# Patient Record
Sex: MALE | Employment: FULL TIME | URBAN - METROPOLITAN AREA
[De-identification: names, ages, dates, MRNs, and addresses within clinical notes are randomized per-mention and may not be internally consistent; named-entity substitution may affect disease eponyms.]

---

## 2022-06-13 ENCOUNTER — OFFICE VISIT (OUTPATIENT)
Dept: URGENT CARE | Facility: CLINIC | Age: 42
End: 2022-06-13
Payer: COMMERCIAL

## 2022-06-13 VITALS
HEART RATE: 85 BPM | DIASTOLIC BLOOD PRESSURE: 84 MMHG | TEMPERATURE: 99 F | SYSTOLIC BLOOD PRESSURE: 120 MMHG | WEIGHT: 170 LBS | RESPIRATION RATE: 16 BRPM | OXYGEN SATURATION: 97 % | BODY MASS INDEX: 26.68 KG/M2 | HEIGHT: 67 IN

## 2022-06-13 DIAGNOSIS — R50.9 FEVER, UNSPECIFIED FEVER CAUSE: ICD-10-CM

## 2022-06-13 DIAGNOSIS — R05.9 COUGH: ICD-10-CM

## 2022-06-13 DIAGNOSIS — R52 BODY ACHES: ICD-10-CM

## 2022-06-13 DIAGNOSIS — U07.1 COVID-19 VIRUS INFECTION: Primary | ICD-10-CM

## 2022-06-13 DIAGNOSIS — J02.9 SORE THROAT: ICD-10-CM

## 2022-06-13 PROBLEM — R00.1 BRADYCARDIA: Status: ACTIVE | Noted: 2020-07-20

## 2022-06-13 LAB
EXTERNAL QUALITY CONTROL: ABNORMAL
FLUAV+FLUBV AG SPEC QL IA: NEGATIVE
INT CON NEG: NORMAL
INT CON NEG: NORMAL
INT CON POS: NORMAL
INT CON POS: NORMAL
S PYO AG THROAT QL: NEGATIVE
SARS-COV+SARS-COV-2 AG RESP QL IA.RAPID: POSITIVE

## 2022-06-13 PROCEDURE — 87426 SARSCOV CORONAVIRUS AG IA: CPT | Performed by: NURSE PRACTITIONER

## 2022-06-13 PROCEDURE — 99203 OFFICE O/P NEW LOW 30 MIN: CPT | Mod: CS | Performed by: NURSE PRACTITIONER

## 2022-06-13 PROCEDURE — 87804 INFLUENZA ASSAY W/OPTIC: CPT | Performed by: NURSE PRACTITIONER

## 2022-06-13 PROCEDURE — 87880 STREP A ASSAY W/OPTIC: CPT | Performed by: NURSE PRACTITIONER

## 2022-06-13 NOTE — LETTER
June 13, 2022       Patient: Maynor Fallon   YOB: 1980   Date of Visit: 6/13/2022         To Whom It May Concern:    Please  follow the the CDC guidelines for return to work or being around others.    In general repeat testing is not necessary and not offered through [facility].  Repeat testing is also not recommended by the CDC.    This is the only note that will be provided from the [facility] for this illness.             Roxanne Kingston, A.P.N.  Electronically Signed

## 2022-06-13 NOTE — PROGRESS NOTES
"Maynor Fallon is a 42 y.o. male who presents for Pharyngitis (Cough starting yesterday, feeling worse today, congestion, headache, chills, fever (103.5) )      RAMÓN Mcguire is a 42-year-old male who presents with a sore throat, cough, congestion, headache, body aches, fever.  His maximum temperature was 103.5 degrees last night.  He reports that he feels awful.  He has not had any exposure to any ill persons.  He has been trying to rest and stay well-hydrated.  He has taken some ibuprofen and Tylenol for his fever.  His symptoms started yesterday evening.  He felt well for most of the day yesterday but was feeling a little tired.  No other aggravating alleviating factors.    ROS    Allergies:     No Known Allergies    PMSFS Hx:  No past medical history on file.  No past surgical history on file.  No family history on file.  Social History     Tobacco Use   • Smoking status: Not on file   • Smokeless tobacco: Not on file   Substance Use Topics   • Alcohol use: Not on file       Problems:   There is no problem list on file for this patient.      Medications:   No current outpatient medications on file prior to visit.     No current facility-administered medications on file prior to visit.          Objective:     /84   Pulse 85   Temp 37.2 °C (99 °F) (Temporal)   Resp 16   Ht 1.702 m (5' 7\")   Wt 77.1 kg (170 lb)   SpO2 97%   BMI 26.63 kg/m²     Physical Exam  Vitals and nursing note reviewed.   Constitutional:       General: He is not in acute distress.     Appearance: Normal appearance. He is well-developed, well-groomed and normal weight. He is ill-appearing. He is not toxic-appearing.   HENT:      Right Ear: Ear canal and external ear normal.      Left Ear: Ear canal and external ear normal.      Nose: Rhinorrhea present.      Mouth/Throat:      Mouth: Mucous membranes are moist.      Pharynx: Posterior oropharyngeal erythema (mild) present.   Eyes:      General: Lids are normal.      Conjunctiva/sclera: " Conjunctivae normal.   Cardiovascular:      Rate and Rhythm: Normal rate and regular rhythm.      Pulses: Normal pulses.      Heart sounds: Normal heart sounds.   Pulmonary:      Effort: Pulmonary effort is normal. No accessory muscle usage.      Breath sounds: Normal breath sounds and air entry.   Chest:   Breasts:      Right: No supraclavicular adenopathy.      Left: No supraclavicular adenopathy.       Musculoskeletal:         General: Normal range of motion.      Cervical back: Full passive range of motion without pain, normal range of motion and neck supple. No rigidity.      Right lower leg: No edema.      Left lower leg: No edema.   Lymphadenopathy:      Cervical: No cervical adenopathy.      Upper Body:      Right upper body: No supraclavicular adenopathy.      Left upper body: No supraclavicular adenopathy.   Skin:     General: Skin is warm and dry.      Capillary Refill: Capillary refill takes less than 2 seconds.      Findings: No rash.   Neurological:      General: No focal deficit present.      Mental Status: He is alert and oriented to person, place, and time.      Coordination: Coordination is intact.   Psychiatric:         Attention and Perception: Attention normal.         Mood and Affect: Mood and affect normal.         Speech: Speech normal.         Behavior: Behavior normal. Behavior is cooperative.         Thought Content: Thought content normal.         Judgment: Judgment normal.     POCT Covid: positive   POCT influenza: negative A & B  POCT strep: negative     Assessment /Associated Orders:      1. COVID-19 virus infection     2. Fever, unspecified fever cause  POCT Influenza A/B    POCT SARS-COV Antigen LEONID (Symptomatic only)    POCT Rapid Strep A   3. Sore throat  POCT Influenza A/B    POCT SARS-COV Antigen LEONID (Symptomatic only)    POCT Rapid Strep A   4. Body aches  POCT Influenza A/B    POCT SARS-COV Antigen LEONID (Symptomatic only)    POCT Rapid Strep A   5. Cough  POCT Influenza A/B     POCT SARS-COV Antigen LEONID (Symptomatic only)    POCT Rapid Strep A       Medical Decision Making:    Pt is clinically stable at today's acute urgent care visit.  No acute distress noted. Appropriate for outpatient management at this time.   Acute problem today with uncertain prognosis.   Quarantine per CDC guidelines  Keep well hydrated  Discussed that this illness was viral in nature. Did not see any evidence of a bacterial process. OTC medications can be used for symptomatic relief of symptoms. Follow manufacturers guidelines for dosing and instructions.   Work note supplied to him asking him to quarantine per the CDC guidelines.  He was educated that yesterday was his onset of symptoms and that would count as day 0.  He is currently on day one of his isolation.    Discussed DDx, management options (risks,benefits, and alternatives to treatment), natural progression and supportive care.  Expressed understanding and the treatment plan was agreed upon. Questions were encouraged and answered   Return to urgent care prn if new or worsening sx or if there is no improvement in condition prn.    Educated in Red flags and indications to immediately call 911 or present to the Emergency Department.     I personally reviewed prior external notes and test results pertinent to today's visit.  I have independently reviewed and interpreted all diagnostics ordered during this urgent care acute visit.   Time spent evaluating this patient was at least 32 minutes and includes preparing for visit, counseling/education, exam and evaluation, obtaining history, independent interpretation, ordering lab/test/procedures,medication management and documentation.Time does not include separately billable procedures noted .